# Patient Record
Sex: MALE | Race: BLACK OR AFRICAN AMERICAN | Employment: OTHER | URBAN - METROPOLITAN AREA
[De-identification: names, ages, dates, MRNs, and addresses within clinical notes are randomized per-mention and may not be internally consistent; named-entity substitution may affect disease eponyms.]

---

## 2019-07-12 ENCOUNTER — OFFICE VISIT (OUTPATIENT)
Dept: ORTHOPEDIC SURGERY | Age: 74
End: 2019-07-12

## 2019-07-12 VITALS
HEART RATE: 74 BPM | RESPIRATION RATE: 16 BRPM | HEIGHT: 70 IN | SYSTOLIC BLOOD PRESSURE: 146 MMHG | BODY MASS INDEX: 27.2 KG/M2 | WEIGHT: 190 LBS | OXYGEN SATURATION: 94 % | DIASTOLIC BLOOD PRESSURE: 91 MMHG

## 2019-07-12 DIAGNOSIS — M79.645 FINGER PAIN, LEFT: ICD-10-CM

## 2019-07-12 DIAGNOSIS — M24.541 CONTRACTURE OF FINGER JOINT, RIGHT: Primary | ICD-10-CM

## 2019-07-12 RX ORDER — OMEPRAZOLE 10 MG/1
10 CAPSULE, DELAYED RELEASE ORAL DAILY
COMMUNITY

## 2019-07-12 RX ORDER — DEXTROMETHORPHAN HYDROBROMIDE, GUAIFENESIN 5; 100 MG/5ML; MG/5ML
1300 LIQUID ORAL
COMMUNITY
Start: 2018-12-15

## 2019-07-12 RX ORDER — ALBUTEROL SULFATE 90 UG/1
2 AEROSOL, METERED RESPIRATORY (INHALATION)
COMMUNITY

## 2019-07-12 RX ORDER — BUTALBITAL, ACETAMINOPHEN AND CAFFEINE 50; 325; 40 MG/1; MG/1; MG/1
1 TABLET ORAL
COMMUNITY
Start: 2019-06-17

## 2019-07-12 RX ORDER — ASCORBIC ACID 250 MG
TABLET ORAL
COMMUNITY

## 2019-07-12 RX ORDER — LIDOCAINE 50 MG/G
PATCH TOPICAL EVERY 24 HOURS
COMMUNITY

## 2019-07-12 RX ORDER — UREA 10 %
100 LOTION (ML) TOPICAL DAILY
COMMUNITY

## 2019-07-12 RX ORDER — ACETAMINOPHEN 500 MG
TABLET ORAL 2 TIMES DAILY
COMMUNITY

## 2019-07-12 NOTE — PROGRESS NOTES
Meliza Piper is a 69 y.o. male right handed retiree. Worker's Compensation and legal considerations: none filed. Vitals:    07/12/19 1053   BP: (!) 146/91   Pulse: 74   Resp: 16   SpO2: 94%   Weight: 190 lb (86.2 kg)   Height: 5' 10\" (1.778 m)   PainSc:   7           Chief Complaint   Patient presents with    Hand Pain     left ring trigger finger         HPI: Patient comes in today with complaints of left ring finger being locked at approximately 100 degrees of flexion at the PIP joint. He reports that it was already starting to contract due to cords in his hand however he fell in April and got locked out of the position is currently in. Date of onset:  April 2019    Injury: Yes: Comment: Fall    Prior Treatment:  No    Numbness/ Tingling: No    ROS: Review of Systems - General ROS: negative  Respiratory ROS: no cough, shortness of breath, or wheezing  Cardiovascular ROS: no chest pain or dyspnea on exertion  Musculoskeletal ROS: positive for - pain in hand - left  Neurological ROS: negative  Dermatological ROS: negative    Past Medical History:   Diagnosis Date    Chronic obstructive pulmonary disease (HCC)     PTSD (post-traumatic stress disorder)        Past Surgical History:   Procedure Laterality Date    HX APPENDECTOMY      HX CATARACT REMOVAL         Current Outpatient Medications   Medication Sig Dispense Refill    HYDROCHLOROTHIAZIDE PO Take  by mouth.  tiotropium bromide (SPIRIVA RESPIMAT) 1.25 mcg/actuation inhaler Take  by inhalation.  acetaminophen (TYLENOL ARTHRITIS PAIN) 650 mg TbER Take 1,300 mg by mouth.  albuterol (PROVENTIL HFA) 90 mcg/actuation inhaler Take 2 Puffs by inhalation.  butalbital-acetaminophen-caffeine (FIORICET, ESGIC) -40 mg per tablet Take 1 Tab by mouth.  lidocaine (LIDODERM) 5 % by TransDERmal route every twenty-four (24) hours. Apply patch to the affected area for 12 hours a day and remove for 12 hours a day.       omeprazole (PRILOSEC) 10 mg capsule Take 10 mg by mouth daily.  cyanocobalamin (VITAMIN B-12) 100 mcg tablet Take 100 mcg by mouth daily.  ascorbic acid, vitamin C, (VITAMIN C) 250 mg tablet Take  by mouth.  cholecalciferol (VITAMIN D3) 2,000 unit cap capsule Take  by mouth two (2) times a day. No Known Allergies      PE:     Left Hand: There is a fixed flexion contracture of the ring finger PIP joint of approximately 100 degrees. This is not passively correctable for more than a few degrees. Imaging:     Plain films of the left hand and ring finger does not show any fracture dislocation about the PIP joint. There may be a old healed fracture of the middle phalanx of the ring finger on the left. ICD-10-CM ICD-9-CM    1. Contracture of finger joint, right M24.541 718.44 REFERRAL TO OCCUPATIONAL THERAPY   2. Finger pain, left M79.645 729.5 AMB POC XRAY, HAND; 3+ VIEWS      CANCELED: AMB POC XRAY, FINGER(S), 2+ VIEWS       Plan:     Refer to occupational therapy for possible serial casting    Follow-up in 3 months for reevaluation and possible surgery or referral to [facility].     Plan was reviewed with patient, who verbalized agreement and understanding of the plan

## 2019-07-17 ENCOUNTER — HOSPITAL ENCOUNTER (OUTPATIENT)
Dept: PHYSICAL THERAPY | Age: 74
Discharge: HOME | End: 2019-07-17
Payer: MEDICARE

## 2019-07-17 PROCEDURE — 97140 MANUAL THERAPY 1/> REGIONS: CPT

## 2019-07-17 PROCEDURE — 97165 OT EVAL LOW COMPLEX 30 MIN: CPT

## 2019-07-17 PROCEDURE — 97535 SELF CARE MNGMENT TRAINING: CPT

## 2019-07-17 NOTE — PROGRESS NOTES
[facility]  [address]  [place] ([place]), [address]  [phone] fax    Plan of Care/Statement of Necessity for Occupational Therapy Services    Patient name: Loida Newell Start of Care: 2019   Referral source: Brina Esteban DO : 1945    Medical Diagnosis: Left hand pain [M79.642]  Payor: [place] MEDICARE / Plan: [place] MEDICARE PART A & B / Product Type: Medicare /  Onset Date:2019    Treatment Diagnosis: Left hand pain secondary to left ring finger PIP contracture   Prior Hospitalization: see medical history Provider#: [id number]   Medications: Verified on Patient summary List    Comorbidities:  Depression, arthritis, HTN, tobacco use, asthma,  Left RF dupuytren's contracture. Prior Level of Function: house work, cooking, driving       The Plan of Care and following information is based on the information from the initial evaluation. Assessment/ key information: Patient is a right hand dominant 69 y.o. male with a chief complaint  of left ring finger pain and contracture at the PIP jt secondary to a mechanical fall on 2019. He reports he had a sharp back pain that struck suddenly, causing him to fall and he extended his arms to break his fall. He reports he received Xrays from the [place] on 6/10/2019 which revealed a fracture of the PIP jt. Xrays from 19 showed no fracture dislocation of the PIP joint however physician reports that is could be a healed fracture of the middle phalanx of the left ring finger. He presents to skilled OT today with observable left ring finger flexion contracture that is not passively correctable. He also appears to have a possible dupuytren's contracture as patient reports his finger would not extend all the way straight prior to recent injury, but it was functional, whereas now he has decreased function of the left hand. He is referred for serial casting to improve digital extension.  Patient received an initial evaluation today followed by education as to diagnosis, precautions and treatment plan. Patient was provided with a basic home exercise program including digit AROM of left hand. Serial casting applied during this treatment session and he was advised to return in one week for new casting. Patient has been advised to return to skilled OT prior to next week if any complications arise from cast, including increased swelling or pain. Evaluation Complexity: History LOW Complexity : Brief history review  Examination LOW Complexity : 1-3 performance deficits relating to physical, cognitive , or psychosocial skils that result in activity limitations and / or participation restrictions  Clinical Decision Making MEDIUM Complexity : Patient may present with comorbidities that affect occupational performnce. Miniml to moderate modification of tasks or assistance (eg, physical or verbal ) with assesment(s) is necessary to enable patient to complete evaluation   Overall Complexity Rating: LOW   Problem List: Pain effecting function, Decreased range of motion, Decreased strength, Edema effecting function, Decreased coordination/prehension, Decreased ADL/functional abilities , Decreased flexibility/joint mobility and Sensability   Treatment Plan may include any combination of the following: Therapeutic exercise, Therapeutic activities, Physical agent/modality, Manual therapy, Splinting/orthoses, Patient education and ADLs/IADLs  Patient / Family readiness to learn indicated by: asking questions  Persons(s) to be included in education:   patient (P)  Barriers to Learning/Limitations: yes;  physical  Patient Goal (s): be able to open hand  Patient Self Reported Health Status: fair  Rehabilitation Potential: fair  Short Term Goals: To be accomplished in 2  weeks:  Goal:* Patient will be compliant with initial home exercise program to take an active role in their rehabilitation process.   Status at [facility]: pt educated to perform digit AROM/PROM of surrounding left hand digits   Goal:* Patient will demonstrate a good understanding of their condition and strategies for self-management. Status at Eval: educated patient on POC, diagnosis, wear and care of serial cast  Long Term Goals: To be accomplished in 6 weeks:              Goal:*Patient will attain 60 degrees or less of left ring finger digital extension to enable him/ her to reach into pocket. Status at eval: 92 degrees  Frequency / Duration: Patient to be seen 1 times per week for 6 weeks:  Patient/ Caregiver education and instruction: Diagnosis, prognosis, self care, brace/ splint application and exercises  [x]  Plan of care has been reviewed with [employer]    Certification Period: 7/17/2019 - 9/15/2019    Juan Coleman OT 7/17/2019 11:47 AM    ________________________________________________________________________    I certify that the above Therapy Services are being furnished while the patient is under my care. I agree with the treatment plan and certify that this therapy is necessary.     Physician's Signature:____________Date:_________TIME:________    ** Signature, Date and Time must be completed for valid certification **    Please sign and return to [facility] [address]  [address]  [place], [address]  [phone] fax

## 2019-07-17 NOTE — PROGRESS NOTES
Hand Therapy Evaluation and Daily Note    Patient Name: Sussy España  Date:2019  : 1945  Age: 69 y.o.y/o  [x]  Patient  Verified  Payor: [facility] MEDICARE / Plan: VA MEDICARE PART A & B / Product Type: Medicare /    Referring Provider: Leandro Leyden, DO Next MD Visit:  None scheduled   Onset Date:  2019  Surgical Date: n/a  Surgical Procedure: n/a    In time:12:00 PM  Out time:12:58 PM  Total Treatment Time (min): 58  Total Timed Codes (min): 28  1:1 Treatment Time (MC only): 62   Visit #: 1 of 6    Treatment Area: Contracture, right hand [M24.541]    Precautions:    Hand Dominance: right handed   Hand Involved: left    Total Evaluation Time:  30    History of Present Condition:  Patient is a right hand dominant 69 y.o. male with a chief complaint  of left ring finger pain and contracture at the PIP jt secondary to a mechanical fall on 2019. He reports he had a sharp back pain that struck suddenly, causing him to fall and he extended his arms to break his fall. He reports he received Xrays from the [place] on 6/10/2019 which revealed a fracture of the PIP jt. Xrays from 19 showed no fracture dislocation of the PIP joint however physician reports that is could be a healed fracture of the middle phalanx of the left ring finger. Pain Rating:   Current: (0-no pain 10-debilitating pain) moderate   At best: (0-no pain 10-debilitating pain) moderate  At worst: (0-no pain 10-debilitating pain) moderate  Location: left finger  Type:  constant   Better with: nothing  Worse with:     Medications/Allergies/Past Medical History:  See chart; reviewed with patient. Depression, arthritis, HTN, tobacco use, asthma,  Left RF dupuytren's contracture. Diagnostic Tests: Imaging:      Plain films of the left hand and ring finger does not show any fracture dislocation about the PIP joint. There may be a old healed fracture of the middle phalanx of the ring finger on the left.     Prior Level of Function: house work, cooking    Current Level of Function:  Decreased ADL/IADL efficiency, pain, decreased AROM    Social History: Pt lives with spouse in home    Occupation/Job Requirements: retired     Observation: left ring finger PIP jt flexion contracture   Scar/incision:   na  Location:  Left RF     Palpation:  Pain with palpation to left ring finger PIP jt    Range of Motion:   Single Digit ROM CHART as measured in degrees  Digit  A/P 7/17/2019   Left RF Date  Side    MP 62 - 82     PIP 92 - 100     DIP 0 - 48     BRISCOE 76       Strength:  DNT    Sensation:    intact    Edema: NT    Special Tests: n/a    ADLs  Feeding:        []MaxA   []ModA   []Astrid   [] CGA   []SBA   [x]Maryam   []Independent  UE Dressing:       []MaxA   []ModA   [x]Astrid   [] CGA   []SBA   []Maryam   []Independent  LE Dressing:       []MaxA   []ModA   [x]Astrid   [] CGA   []SBA   []Maryam   []Independent  Grooming:       []MaxA   []ModA   []Astrid   [] CGA   []SBA   [x]Maryam   []Independent  Toileting:       []MaxA   []ModA   []Astrid   [] CGA   []SBA   [x]Maryam   []Independent  Bathing:       []MaxA   []ModA   [x]Astrid   [] CGA   []SBA   []Maryam   []Independent  Light Meal Prep:    []MaxA   [x]ModA   []Astrid   [] CGA   []SBA   []Maryam   []Independent  Household/Other: []MaxA   [x]ModA   []Astrid   [] CGA   []SBA   []Maryam   []Independent  Adaptive Equip:     []MaxA   []ModA   []Astrid   [] CGA   []SBA   []Maryam   []Independent  Driving:       []MaxA   [x]ModA   []Astrid   [] CGA   []SBA   []Maryam   []Independent      Todays Treatment:  Patient received an initial evaluation today followed by education as to diagnosis, precautions and treatment plan. Patient was provided with a basic home exercise program including digit AROM of left hand.       OBJECTIVE    18 min Manual Therapy:  Serial casting, passive stretching of left ring finger   Rationale: increase ROM to left ring finger PIP jt    10 min Self Care/Home Management: wear and care   Rationale: education  to improve the patients ability to wear casting on left ring finger    With   [] TE   [] TA   [] neuro   [] other: Patient Education: [x] Review HEP    [] Progressed/Changed HEP based on:   [] positioning   [] body mechanics   [] transfers   [] heat/ice application   [] Splint wear/care   [] Sensory re-education   [] scar management      [] other:      Pain Level (0-10 scale) post treatment: 5/10    Patient will continue to benefit from skilled OT services to modify and progress therapeutic interventions and address ROM deficits to attain goals. Assessment: left RIF flexion contracture that is not passively correctable. Short Term Goals: To be accomplished in 2  weeks:  Goal:* Patient will be compliant with initial home exercise program to take an active role in their rehabilitation process. Status at Eval: pt educated to perform digit AROM/PROM of surrounding left hand digits    Goal:* Patient will demonstrate a good understanding of their condition and strategies for self-management. Status at Eval: educated patient on POC, diagnosis, wear and care of serial cast    Long Term Goals: To be accomplished in 6 weeks:   Goal:*Patient will attain 60 degrees or less of left ring finger digital extension to enable him/ her to reach into pocket. Status at eval: 92 degrees     Frequency / Duration: Patient to be seen 1 times per week for 6 weeks:    Patient/ Caregiver education and instruction: Diagnosis, prognosis, self care, brace/ splint application and exercises    Functional Status Measure:  Patient's:39  FOTO Benchmark: 59  Expected Change: 20  FOTO score based on 0 - 100 point scale, with 100 being no impairment.  These scores are determined by patient reported functional assessments compared against national benchmarked data.     Certification Period: 7/17/2019 - 9/15/2019    Teodoro Roe OT 7/17/2019 11:45 AM

## 2019-07-24 ENCOUNTER — HOSPITAL ENCOUNTER (OUTPATIENT)
Dept: PHYSICAL THERAPY | Age: 74
Discharge: HOME | End: 2019-07-24
Payer: MEDICARE

## 2019-07-24 PROCEDURE — 97535 SELF CARE MNGMENT TRAINING: CPT

## 2019-07-24 NOTE — PROGRESS NOTES
OT DISCHARGE DAILY NOTE AND SUMMARY- [facility]     Date:2019  Patient name: Ana Rosa Sampson Start of Care: 19   Referral source: Madi Bernard DO : 1945   Medical/Treatment Diagnosis: Left hand pain [M79.642] Onset Date:19- injury     Prior Hospitalization: see medical history Provider#: [id number]   Medications: Verified on Patient Summary List    Comorbidities:  Depression, arthritis, HTN, tobacco use, asthma,  Left RF dupuytren's contracture. Prior Level of Function: house work, cooking, driving    Visits from [place] of Care: 1    Missed Visits: 0    Reporting Period : 19 to 19  [x]  Patient  Verified  Payor: [facility] MEDICARE / Plan: VA MEDICARE PART A & B / Product Type: Medicare /    In time:1140  Out time:157  Total Treatment Time (min): 18  Total Timed Codes (min): *18  1:1 Treatment Time ( only): 8   Visit #: 2 of 8    Treatment Area: Left hand pain [M79.642]    SUBJECTIVE  Pain Level (0-10 scale): 5  Any medication changes, allergies to medications, adverse drug reactions, diagnosis change, or new procedure performed?: [x] No    [] Yes (see summary sheet for update)  Subjective functional status/changes:   [] No changes reported      OBJECTIVE    18 min Self Care/Home Management: *education on diagnosis and failure of chosen treatment secondary to severity of contracture   Rationale: education  to improve the patients ability to understand why therapy will no be able to help his current condition secondary to soft tissue contracture with dupuytren's diagnosis    With   [] TE   [] TA   [] neuro   [] other: Patient Education: [x] Review HEP    [] Progressed/Changed HEP based on:   [] positioning   [] body mechanics   [] transfers   [] heat/ice application   [] Splint wear/care   [] Sensory re-education   [] scar management      [] other:            Other Objective/Functional Measures:NT     Pain Level (0-10 scale) post treatment:5/10    Summary of Care:  Short Term Goals:  To be accomplished in 2  weeks:  Goal:* Patient will be compliant with initial home exercise program to take an active role in their rehabilitation process. Status at Eval: pt educated to perform digit AROM/PROM of surrounding left hand digits   Goal:* Patient will demonstrate a good understanding of their condition and strategies for self-management. Status at Eval: educated patient on POC, diagnosis, wear and care of serial cast  Long Term Goals: To be accomplished in 6 weeks:              Goal:*Patient will attain 60 degrees or less of left ring finger digital extension to enable him/ her to reach into pocket.   Status at eval: 92 degrees    ASSESSMENT/Changes in Function: Patient was able to wear cast to the left RF however, there was no change in ROM and poor ability to get the proper leverage to     PLAN:  [x]Discontinue therapy: []Patient has reached or is progressing toward set goals      [x]Patient has abdicated, he wants to pursue surgical intervention for contractures if possible as he does not feel like the casting was doing anything      []Due to lack of appreciable progress towards set goals    Thank you for this referral!    Jose Carlos Wilson OT 7/24/2019 3:11 PM

## 2019-07-31 ENCOUNTER — HOSPITAL ENCOUNTER (OUTPATIENT)
Dept: PHYSICAL THERAPY | Age: 74
End: 2019-07-31
Payer: MEDICARE

## 2019-08-07 ENCOUNTER — APPOINTMENT (OUTPATIENT)
Dept: PHYSICAL THERAPY | Age: 74
End: 2019-08-07

## 2019-08-14 ENCOUNTER — APPOINTMENT (OUTPATIENT)
Dept: PHYSICAL THERAPY | Age: 74
End: 2019-08-14

## 2019-08-21 ENCOUNTER — APPOINTMENT (OUTPATIENT)
Dept: PHYSICAL THERAPY | Age: 74
End: 2019-08-21

## 2023-09-29 ENCOUNTER — OFFICE VISIT (OUTPATIENT)
Age: 78
End: 2023-09-29
Payer: OTHER GOVERNMENT

## 2023-09-29 VITALS
OXYGEN SATURATION: 98 % | DIASTOLIC BLOOD PRESSURE: 71 MMHG | WEIGHT: 189 LBS | HEIGHT: 70 IN | BODY MASS INDEX: 27.06 KG/M2 | SYSTOLIC BLOOD PRESSURE: 125 MMHG | HEART RATE: 77 BPM

## 2023-09-29 DIAGNOSIS — I10 ESSENTIAL HYPERTENSION: ICD-10-CM

## 2023-09-29 DIAGNOSIS — Z71.6 TOBACCO ABUSE COUNSELING: ICD-10-CM

## 2023-09-29 DIAGNOSIS — Z87.09 HISTORY OF COPD: ICD-10-CM

## 2023-09-29 DIAGNOSIS — I25.118 CORONARY ARTERY DISEASE INVOLVING NATIVE CORONARY ARTERY OF NATIVE HEART WITH OTHER FORM OF ANGINA PECTORIS (HCC): Primary | ICD-10-CM

## 2023-09-29 PROCEDURE — 99204 OFFICE O/P NEW MOD 45 MIN: CPT | Performed by: INTERNAL MEDICINE

## 2023-09-29 PROCEDURE — 3078F DIAST BP <80 MM HG: CPT | Performed by: INTERNAL MEDICINE

## 2023-09-29 PROCEDURE — 93000 ELECTROCARDIOGRAM COMPLETE: CPT | Performed by: INTERNAL MEDICINE

## 2023-09-29 PROCEDURE — 3074F SYST BP LT 130 MM HG: CPT | Performed by: INTERNAL MEDICINE

## 2023-09-29 PROCEDURE — 1123F ACP DISCUSS/DSCN MKR DOCD: CPT | Performed by: INTERNAL MEDICINE

## 2023-09-29 RX ORDER — SILDENAFIL 100 MG/1
100 TABLET, FILM COATED ORAL
COMMUNITY
Start: 2023-01-27

## 2023-09-29 RX ORDER — LISINOPRIL 20 MG/1
20 TABLET ORAL DAILY
Qty: 90 TABLET | Refills: 1 | Status: SHIPPED | OUTPATIENT
Start: 2023-09-29

## 2023-09-29 RX ORDER — ATORVASTATIN CALCIUM 20 MG/1
20 TABLET, FILM COATED ORAL DAILY
Qty: 90 TABLET | Refills: 3 | Status: SHIPPED | OUTPATIENT
Start: 2023-09-29 | End: 2023-09-29 | Stop reason: SDUPTHER

## 2023-09-29 RX ORDER — AMLODIPINE BESYLATE 5 MG/1
5 TABLET ORAL DAILY
Qty: 90 TABLET | Refills: 1 | Status: SHIPPED | OUTPATIENT
Start: 2023-09-29

## 2023-09-29 RX ORDER — ASCORBIC ACID 250 MG
TABLET ORAL
COMMUNITY

## 2023-09-29 RX ORDER — SERTRALINE HYDROCHLORIDE 100 MG/1
50 TABLET, FILM COATED ORAL
COMMUNITY
Start: 2023-09-26

## 2023-09-29 RX ORDER — ATORVASTATIN CALCIUM 20 MG/1
20 TABLET, FILM COATED ORAL DAILY
Qty: 90 TABLET | Refills: 3 | Status: SHIPPED | OUTPATIENT
Start: 2023-09-29

## 2023-09-29 RX ORDER — ALBUTEROL SULFATE 2.5 MG/3ML
SOLUTION RESPIRATORY (INHALATION)
COMMUNITY
Start: 2022-10-17

## 2023-09-29 RX ORDER — LISINOPRIL AND HYDROCHLOROTHIAZIDE 12.5; 1 MG/1; MG/1
TABLET ORAL
COMMUNITY
Start: 2023-07-17 | End: 2023-09-29 | Stop reason: DRUGHIGH

## 2023-09-29 RX ORDER — ASPIRIN 81 MG/1
1 TABLET ORAL DAILY
COMMUNITY
Start: 2022-10-17

## 2023-09-29 RX ORDER — AMLODIPINE BESYLATE 5 MG/1
5 TABLET ORAL DAILY
Qty: 90 TABLET | Refills: 1 | Status: SHIPPED | OUTPATIENT
Start: 2023-09-29 | End: 2023-09-29 | Stop reason: SDUPTHER

## 2023-09-29 RX ORDER — SENNOSIDES 8.6 MG
1300 CAPSULE ORAL
COMMUNITY
Start: 2018-12-15

## 2023-09-29 RX ORDER — LISINOPRIL 20 MG/1
20 TABLET ORAL DAILY
Qty: 90 TABLET | Refills: 1 | Status: SHIPPED | OUTPATIENT
Start: 2023-09-29 | End: 2023-09-29 | Stop reason: SDUPTHER

## 2023-09-29 RX ORDER — FLUTICASONE PROPIONATE 50 MCG
SPRAY, SUSPENSION (ML) NASAL
COMMUNITY
Start: 2023-07-27

## 2023-09-29 RX ORDER — LIDOCAINE 50 MG/G
PATCH TOPICAL
COMMUNITY
Start: 2018-12-15

## 2023-09-29 ASSESSMENT — PATIENT HEALTH QUESTIONNAIRE - PHQ9
2. FEELING DOWN, DEPRESSED OR HOPELESS: 0
SUM OF ALL RESPONSES TO PHQ QUESTIONS 1-9: 0
SUM OF ALL RESPONSES TO PHQ QUESTIONS 1-9: 0
SUM OF ALL RESPONSES TO PHQ9 QUESTIONS 1 & 2: 0
SUM OF ALL RESPONSES TO PHQ QUESTIONS 1-9: 0
SUM OF ALL RESPONSES TO PHQ QUESTIONS 1-9: 0
1. LITTLE INTEREST OR PLEASURE IN DOING THINGS: 0

## 2023-09-29 ASSESSMENT — ENCOUNTER SYMPTOMS
EYES NEGATIVE: 1
RESPIRATORY NEGATIVE: 1
GASTROINTESTINAL NEGATIVE: 1

## 2023-09-29 NOTE — PROGRESS NOTES
Natty Estrada is a 70y.o. year old male. 9/29/2023 seen as a new patient for CAD. Patient has had chest pain for many years almost on a daily basis present in the mid retrosternal area without any radiation. No associated nausea vomiting diaphoresis noted. It last for a few minutes. He is unable to state whether it is related to stress but he does suffer from PTSD and anxiety. [facility] as patient tells me. I do not have any report of stress test from [facility].  He gets dyspneic on walking a little, again unable to quantify. Denies edema but has occasional palpitations, again unable to give any details. Review of Systems   Constitutional: Negative. HENT: Negative. Eyes: Negative. Respiratory: Negative. Cardiovascular: Negative. Gastrointestinal: Negative. Endocrine: Negative. Genitourinary: Negative. Musculoskeletal: Negative. Neurological: Negative. Psychiatric/Behavioral: Negative. All other systems reviewed and are negative. Physical Exam  Vitals and nursing note reviewed. Constitutional:       Appearance: Normal appearance. HENT:      Head: Normocephalic and atraumatic. Nose: Nose normal.   Eyes:      Conjunctiva/sclera: Conjunctivae normal.   Cardiovascular:      Rate and Rhythm: Normal rate and regular rhythm. Pulses: Normal pulses. Heart sounds: Normal heart sounds. Pulmonary:      Effort: Pulmonary effort is normal.      Breath sounds: Normal breath sounds. Abdominal:      General: Bowel sounds are normal.      Palpations: Abdomen is soft. Musculoskeletal:         General: Normal range of motion. Right lower leg: No edema. Left lower leg: No edema. Skin:     General: Skin is warm and dry. Neurological:      General: No focal deficit present. Mental Status: He is alert and oriented to person, place, and time.    Psychiatric:         Mood and

## 2023-10-03 DIAGNOSIS — I25.118 CORONARY ARTERY DISEASE INVOLVING NATIVE CORONARY ARTERY OF NATIVE HEART WITH OTHER FORM OF ANGINA PECTORIS (HCC): ICD-10-CM

## 2023-10-03 DIAGNOSIS — I10 ESSENTIAL HYPERTENSION: ICD-10-CM

## 2023-10-03 RX ORDER — LISINOPRIL 20 MG/1
20 TABLET ORAL DAILY
Qty: 90 TABLET | Refills: 1 | Status: SHIPPED | OUTPATIENT
Start: 2023-10-03

## 2023-10-03 RX ORDER — ATORVASTATIN CALCIUM 20 MG/1
20 TABLET, FILM COATED ORAL DAILY
Qty: 90 TABLET | Refills: 3 | Status: SHIPPED | OUTPATIENT
Start: 2023-10-03

## 2023-10-03 RX ORDER — AMLODIPINE BESYLATE 5 MG/1
5 TABLET ORAL DAILY
Qty: 90 TABLET | Refills: 1 | Status: SHIPPED | OUTPATIENT
Start: 2023-10-03

## 2023-10-03 NOTE — TELEPHONE ENCOUNTER
Requested Prescriptions     Pending Prescriptions Disp Refills    amLODIPine (NORVASC) 5 MG tablet 90 tablet 1     Sig: Take 1 tablet by mouth daily    atorvastatin (LIPITOR) 20 MG tablet 90 tablet 3     Sig: Take 1 tablet by mouth daily    lisinopril (PRINIVIL;ZESTRIL) 20 MG tablet 90 tablet 1     Sig: Take 1 tablet by mouth daily

## 2023-11-03 ENCOUNTER — OFFICE VISIT (OUTPATIENT)
Age: 78
End: 2023-11-03

## 2023-11-03 VITALS
DIASTOLIC BLOOD PRESSURE: 74 MMHG | SYSTOLIC BLOOD PRESSURE: 121 MMHG | OXYGEN SATURATION: 97 % | HEIGHT: 70 IN | WEIGHT: 183 LBS | HEART RATE: 80 BPM | BODY MASS INDEX: 26.2 KG/M2

## 2023-11-03 DIAGNOSIS — I10 ESSENTIAL HYPERTENSION: ICD-10-CM

## 2023-11-03 DIAGNOSIS — I25.118 CORONARY ARTERY DISEASE INVOLVING NATIVE CORONARY ARTERY OF NATIVE HEART WITH OTHER FORM OF ANGINA PECTORIS (HCC): ICD-10-CM

## 2023-11-03 DIAGNOSIS — Z71.6 TOBACCO ABUSE COUNSELING: ICD-10-CM

## 2023-11-03 DIAGNOSIS — I20.0 UNSTABLE ANGINA (HCC): Primary | ICD-10-CM

## 2023-11-03 RX ORDER — LISINOPRIL AND HYDROCHLOROTHIAZIDE 12.5; 1 MG/1; MG/1
1 TABLET ORAL DAILY
COMMUNITY

## 2023-11-03 RX ORDER — NITROGLYCERIN 0.3 MG/1
0.3 TABLET SUBLINGUAL EVERY 5 MIN PRN
Qty: 30 TABLET | Refills: 3 | Status: SHIPPED | OUTPATIENT
Start: 2023-11-03

## 2023-11-03 RX ORDER — NITROGLYCERIN 0.3 MG/1
0.3 TABLET SUBLINGUAL EVERY 5 MIN PRN
Qty: 30 TABLET | Refills: 3 | Status: SHIPPED | OUTPATIENT
Start: 2023-11-03 | End: 2023-11-03 | Stop reason: SDUPTHER

## 2023-11-03 ASSESSMENT — ENCOUNTER SYMPTOMS
WHEEZING: 0
BACK PAIN: 0
NAUSEA: 0
EYES NEGATIVE: 1
ABDOMINAL DISTENTION: 0
CHEST TIGHTNESS: 1
APNEA: 0
COUGH: 0
SHORTNESS OF BREATH: 0
ABDOMINAL PAIN: 0
GASTROINTESTINAL NEGATIVE: 1

## 2023-11-03 ASSESSMENT — PATIENT HEALTH QUESTIONNAIRE - PHQ9
SUM OF ALL RESPONSES TO PHQ QUESTIONS 1-9: 0
SUM OF ALL RESPONSES TO PHQ QUESTIONS 1-9: 0
SUM OF ALL RESPONSES TO PHQ9 QUESTIONS 1 & 2: 0
2. FEELING DOWN, DEPRESSED OR HOPELESS: 0
SUM OF ALL RESPONSES TO PHQ QUESTIONS 1-9: 0
SUM OF ALL RESPONSES TO PHQ QUESTIONS 1-9: 0
1. LITTLE INTEREST OR PLEASURE IN DOING THINGS: 0

## 2023-11-03 NOTE — PROGRESS NOTES
Jasvir Gonzalez is a 70y.o. year old male. 9/29/2023 seen as a new patient for CAD. Patient has had chest pain for many years almost on a daily basis present in the mid retrosternal area without any radiation. No associated nausea vomiting diaphoresis noted. It last for a few minutes. He is unable to state whether it is related to stress but he does suffer from PTSD and anxiety. [facility] as patient tells me. I do not have any report of stress test from [facility].  He gets dyspneic on walking a little, again unable to quantify. Denies edema but has occasional palpitations, again unable to give any details. 11/3/2023  Patient seen in f/u for chest pain. He complains of ongoing chest pain intermittent throughout the day worse with exertion. It does not radiate. At last office visit medications were adjusted he only took for 1 day then discontinued and resumed prior regimen. He complained of dizziness with medications. Patient continues to smoke 2-3 packs of cigarettes per day. Review of Systems   Constitutional: Negative. Negative for activity change and fatigue. HENT: Negative. Negative for congestion. Eyes: Negative. Negative for visual disturbance. Respiratory:  Positive for chest tightness. Negative for apnea, cough, shortness of breath and wheezing. Cardiovascular:  Positive for chest pain. Negative for palpitations and leg swelling. Gastrointestinal: Negative. Negative for abdominal distention, abdominal pain and nausea. Endocrine: Negative. Genitourinary: Negative. Negative for hematuria. Musculoskeletal: Negative. Negative for arthralgias, back pain and myalgias. Skin:  Negative for pallor. Neurological: Negative. Negative for dizziness, syncope and headaches. Psychiatric/Behavioral: Negative. All other systems reviewed and are negative.         Physical Exam  Vitals and nursing note

## 2023-11-03 NOTE — PROGRESS NOTES
1. Have you been to the ER, urgent care clinic since your last visit? Hospitalized since your last visit?     no    2. Have you seen or consulted any other health care providers outside of the [address] [place] [address] since your last visit? Include any pap smears or colon screening.       no

## 2023-11-07 ENCOUNTER — TELEPHONE (OUTPATIENT)
Age: 78
End: 2023-11-07

## 2023-11-15 ENCOUNTER — TELEPHONE (OUTPATIENT)
Age: 78
End: 2023-11-15

## 2023-11-15 NOTE — TELEPHONE ENCOUNTER
Spoke with Shahriar More from [address] [place] [address] regarding authorization for cardiac cath. She states patient referral covers him to have 1 cardiac cath. So his authorization is good.

## 2023-11-22 PROBLEM — Z98.890 HISTORY OF LEFT HEART CATHETERIZATION (LHC): Status: ACTIVE | Noted: 2023-11-03

## 2023-11-22 PROBLEM — I25.118 ATHSCL HEART DISEASE OF NATIVE COR ART W OTH ANG PCTRS (HCC): Status: ACTIVE | Noted: 2023-11-03

## 2023-11-22 PROBLEM — I20.0 INTERMEDIATE CORONARY SYNDROME (HCC): Status: ACTIVE | Noted: 2023-11-03

## 2023-11-27 NOTE — TELEPHONE ENCOUNTER
----- Message from Karon Mckeon sent at 11/3/2023 10:00 AM EDT -----  Regarding: CASE CATH REQUEST  PLEASE SCHEDULE A CASE CATH RERQUEST PER JUAN JOSÉ.
November 27, 2023 spoke with lady patient and she is aware of their Cath, date time, location and instructions. Was advised to call the office if he has any questions or concerns.
Spoke with Avelina Isbell from [address] [place] [address] regarding authorization for cardiac cath. She states patient referral covers him to have 1 cardiac cath. So his authorization is good.
and ask to speak with one of the nurses.  You may also call [facility], which is the Pre-Op and Recovery area for these procedures at [phone] Monday-Friday 7:30am to 4:30pm.

## 2023-12-05 ENCOUNTER — TELEPHONE (OUTPATIENT)
Age: 78
End: 2023-12-05

## 2023-12-05 NOTE — TELEPHONE ENCOUNTER
Patient called and stated he didnt show up for cath on Monday and he will get an second opinion. He states he aunt gave him some old remedies and he will try them.